# Patient Record
Sex: FEMALE | Race: WHITE
[De-identification: names, ages, dates, MRNs, and addresses within clinical notes are randomized per-mention and may not be internally consistent; named-entity substitution may affect disease eponyms.]

---

## 2021-01-03 ENCOUNTER — HOSPITAL ENCOUNTER (EMERGENCY)
Dept: HOSPITAL 75 - ER FS | Age: 66
Discharge: HOME | End: 2021-01-03
Payer: COMMERCIAL

## 2021-01-03 VITALS — SYSTOLIC BLOOD PRESSURE: 136 MMHG | DIASTOLIC BLOOD PRESSURE: 63 MMHG

## 2021-01-03 VITALS — SYSTOLIC BLOOD PRESSURE: 164 MMHG | DIASTOLIC BLOOD PRESSURE: 79 MMHG

## 2021-01-03 DIAGNOSIS — R42: Primary | ICD-10-CM

## 2021-01-03 DIAGNOSIS — Z88.8: ICD-10-CM

## 2021-01-03 DIAGNOSIS — R11.2: ICD-10-CM

## 2021-01-03 DIAGNOSIS — R19.7: ICD-10-CM

## 2021-01-03 DIAGNOSIS — R03.0: ICD-10-CM

## 2021-01-03 LAB
ALBUMIN SERPL-MCNC: 4.3 GM/DL (ref 3.2–4.5)
ALP SERPL-CCNC: 97 U/L (ref 40–136)
ALT SERPL-CCNC: 30 U/L (ref 0–55)
APTT BLD: 31 SEC (ref 24–35)
APTT PPP: YELLOW S
BACTERIA #/AREA URNS HPF: NEGATIVE /HPF
BASOPHILS # BLD AUTO: 0 10^3/UL (ref 0–0.1)
BASOPHILS NFR BLD AUTO: 0 % (ref 0–10)
BILIRUB SERPL-MCNC: 0.6 MG/DL (ref 0.1–1)
BILIRUB UR QL STRIP: NEGATIVE
BUN/CREAT SERPL: 15
CALCIUM SERPL-MCNC: 9.7 MG/DL (ref 8.5–10.1)
CHLORIDE SERPL-SCNC: 101 MMOL/L (ref 98–107)
CO2 SERPL-SCNC: 24 MMOL/L (ref 21–32)
CREAT SERPL-MCNC: 0.78 MG/DL (ref 0.6–1.3)
EOSINOPHIL # BLD AUTO: 0 10^3/UL (ref 0–0.3)
EOSINOPHIL NFR BLD AUTO: 0 % (ref 0–10)
FIBRINOGEN PPP-MCNC: CLEAR MG/DL
GFR SERPLBLD BASED ON 1.73 SQ M-ARVRAT: > 60 ML/MIN
GLUCOSE SERPL-MCNC: 138 MG/DL (ref 70–105)
GLUCOSE UR STRIP-MCNC: NEGATIVE MG/DL
HCT VFR BLD CALC: 39 % (ref 35–52)
HGB BLD-MCNC: 13 G/DL (ref 11.5–16)
INR PPP: 1 (ref 0.8–1.4)
KETONES UR QL STRIP: NEGATIVE
LEUKOCYTE ESTERASE UR QL STRIP: NEGATIVE
LIPASE SERPL-CCNC: 31 U/L (ref 8–78)
LYMPHOCYTES # BLD AUTO: 1.5 X 10^3 (ref 1–4)
LYMPHOCYTES NFR BLD AUTO: 18 % (ref 12–44)
MAGNESIUM SERPL-MCNC: 2 MG/DL (ref 1.6–2.4)
MANUAL DIFFERENTIAL PERFORMED BLD QL: NO
MCH RBC QN AUTO: 30 PG (ref 25–34)
MCHC RBC AUTO-ENTMCNC: 33 G/DL (ref 32–36)
MCV RBC AUTO: 90 FL (ref 80–99)
MONOCYTES # BLD AUTO: 0.3 X 10^3 (ref 0–1)
MONOCYTES NFR BLD AUTO: 3 % (ref 0–12)
NEUTROPHILS # BLD AUTO: 6.5 X 10^3 (ref 1.8–7.8)
NEUTROPHILS NFR BLD AUTO: 78 % (ref 42–75)
NITRITE UR QL STRIP: NEGATIVE
PH UR STRIP: 7 [PH] (ref 5–9)
PLATELET # BLD: 254 10^3/UL (ref 130–400)
PMV BLD AUTO: 10.4 FL (ref 7.4–10.4)
POTASSIUM SERPL-SCNC: 4 MMOL/L (ref 3.6–5)
PROT SERPL-MCNC: 7.1 GM/DL (ref 6.4–8.2)
PROT UR QL STRIP: NEGATIVE
PROTHROMBIN TIME: 13.1 SEC (ref 12.2–14.7)
RBC #/AREA URNS HPF: (no result) /HPF
SODIUM SERPL-SCNC: 136 MMOL/L (ref 135–145)
SP GR UR STRIP: 1.01 (ref 1.02–1.02)
SQUAMOUS #/AREA URNS HPF: (no result) /HPF
WBC # BLD AUTO: 8.3 10^3/UL (ref 4.3–11)
WBC #/AREA URNS HPF: (no result) /HPF

## 2021-01-03 PROCEDURE — 36415 COLL VENOUS BLD VENIPUNCTURE: CPT

## 2021-01-03 PROCEDURE — 81000 URINALYSIS NONAUTO W/SCOPE: CPT

## 2021-01-03 PROCEDURE — 85610 PROTHROMBIN TIME: CPT

## 2021-01-03 PROCEDURE — 85730 THROMBOPLASTIN TIME PARTIAL: CPT

## 2021-01-03 PROCEDURE — 80053 COMPREHEN METABOLIC PANEL: CPT

## 2021-01-03 PROCEDURE — 85025 COMPLETE CBC W/AUTO DIFF WBC: CPT

## 2021-01-03 PROCEDURE — 83735 ASSAY OF MAGNESIUM: CPT

## 2021-01-03 PROCEDURE — 84484 ASSAY OF TROPONIN QUANT: CPT

## 2021-01-03 PROCEDURE — 83690 ASSAY OF LIPASE: CPT

## 2021-01-03 PROCEDURE — 93041 RHYTHM ECG TRACING: CPT

## 2021-01-03 PROCEDURE — 93005 ELECTROCARDIOGRAM TRACING: CPT

## 2021-01-03 PROCEDURE — 71045 X-RAY EXAM CHEST 1 VIEW: CPT

## 2021-01-03 PROCEDURE — 83880 ASSAY OF NATRIURETIC PEPTIDE: CPT

## 2021-01-03 NOTE — ED GENERAL
General


Chief Complaint:  General Problems/Pain


Stated Complaint:  CHEST PAIN


Source of Information:  Patient, EMS





History of Present Illness


Date Seen by Provider:  Osman 3, 2021


Time Seen by Provider:  05:29


Initial Comments


66 yo female presenting to the ED by EMS after having n/v/d and dizziness with 

subjective fever and chills since last night around 2200. She reports her 

symptoms started with dizziness then the n/v/d and subjective fever and chills 

followed. She had to sleep upright in a chair because if she tried to lay back 

in bed she had more severe nausea and vomiting. She woke up this am around 230 

with right sided jaw pain and pain in center of chest. She checked her blood 

pressure at home and it was 180/100 so she took 4 baby aspirin with a cracker. 

She was worried that she would have a stroke with her blood pressure up and 

wanted the aspirin to thin her blood. She has been checking her pressure over 

the last few months because it has been fluctuating and increasing, especially 

at night. She just had Medicare start on January 1st and was assigned to a Dr. Vences here in [facility]. She reports that she takes some vitamins but no 

prescription medicine. She had an allergic reaction to prednisone eye drops 

otherwise no allergies that she knows of to cause problems from medicines.  This

morning she called EMS around 5 am to be evaluated because she was worried she 

might be having a stroke due to pressure and feeling weak all over. She has not 

had any exposure to anyone that is sick or having similar symptoms with  n/v/d. 

She has had vertigo in the past and it has caused her to have n/v but usually 

improved after she would vomit and this persisted all night.





Allergies and Home Medications


Allergies


Coded Allergies:  


     prednisone (Verified  Allergy, Unknown, 1/3/21)


   reaction to eye drops





Home Medications


Meclizine HCl 25 Mg Tablet, 25 MG PO Q6H PRN for DIZZINESS


   Prescribed by: HAFSA JEFF on 1/3/21 0644





Patient Home Medication List


Home Medication List Reviewed:  Yes





Review of Systems


Review of Systems


Constitutional:  chills, dizziness (with laying back, changing positions ), 

fever (subjective), weakness (general)


EENTM:  ear pain (pressure in ears); No epistaxis, No nose congestion


Respiratory:  No cough, No short of breath


Cardiovascular:  see HPI, chest pain (pain in center of chest and right jaw 

earlier this am)


Gastrointestinal:  see HPI; No abdominal pain; diarrhea; No melena; nausea, 

vomiting


Genitourinary:  no symptoms reported


Musculoskeletal:  no symptoms reported


Skin:  no symptoms reported; No rash


Psychiatric/Neurological:  Denies Headache, Denies Numbness, Denies Paresthesia;

Weakness (general)





Past Medical-Social-Family Hx


Past Med/Social Hx:  Reviewed Nursing Past Med/Soc Hx


Patient Social History


Alcohol Use:  Denies Use


Recreational Drug Use:  No


Smoking Status:  Never a Smoker


Recent Foreign Travel:  No


Contact w/Someone Who Travel:  No





Past Medical History


Surgeries:  No


Respiratory:  No


Cardiac:  Yes


Hypertension (labile hypertension)


Neurological:  No


Reproductive Disorders:  No


Genitourinary:  No


Musculoskeletal:  No


Endocrine:  No


HEENT:  No


Cancer:  No


Did You Recieve Any Treatments:  No


Psychosocial:  No


Integumentary:  No





Physical Exam


Vital Signs





Vital Signs - First Documented








 1/3/21





 05:35


 


Temp 36.5


 


Pulse 87


 


Resp 18


 


B/P (MAP) 191/90 (123)


 


Pulse Ox 99


 


O2 Delivery Room Air





Capillary Refill :


Height, Weight, BMI


Height: '"


Weight: lbs. oz. kg;  BMI


Method:


General Appearance:  No Apparent Distress, WD/WN


HEENT:  PERRL/EOMI, Moist Mucous Membranes, TM Abnormal (L) (clear TM with small

amount of clear effusion present in middle ear. No erythema or dullness noted.),

TM Abnormal (R) (dullness without erythema to TM)


Neck:  Full Range of Motion, Normal Inspection, Non Tender, Supple


Respiratory:  Chest Non Tender, Lungs Clear, Normal Breath Sounds, No Accessory 

Muscle Use, No Respiratory Distress


Cardiovascular:  Regular Rate, Rhythm, No JVD, No Murmur, Normal Peripheral 

Pulses


Gastrointestinal:  Normal Bowel Sounds, No Pulsatile Mass, Non Tender, Soft


Rectal:  Deferred


Extremity:  Normal Capillary Refill, Normal Inspection, No Pedal Edema


Neurologic/Psychiatric:  Alert, Oriented x3, Normal Mood/Affect, CNs II-XII Norm

as Tested


Skin:  Normal Color, Warm/Dry





Progress/Results/Core Measures


Suspected Sepsis


SIRS


Temperature: 


Pulse:  


Respiratory Rate: 


 


Laboratory Tests


1/3/21 05:41: White Blood Count 8.3


Blood Pressure  / 


Mean: 


 


Laboratory Tests


1/3/21 05:41: 


Creatinine 0.78, INR Comment 1.0, Platelet Count 254, Total Bilirubin 0.6








Results/Orders


Lab Results





Laboratory Tests








Test


 1/3/21


05:41 1/3/21


05:50 Range/Units


 


 


White Blood Count


 8.3 


 


 4.3-11.0


10^3/uL


 


Red Blood Count


 4.33 L


 


 4.35-5.85


10^6/uL


 


Hemoglobin 13.0   11.5-16.0  G/DL


 


Hematocrit 39   35-52  %


 


Mean Corpuscular Volume 90   80-99  FL


 


Mean Corpuscular Hemoglobin 30   25-34  PG


 


Mean Corpuscular Hemoglobin


Concent 33 


 


 32-36  G/DL





 


Red Cell Distribution Width 12.5   10.0-14.5  %


 


Platelet Count


 254 


 


 130-400


10^3/uL


 


Mean Platelet Volume 10.4   7.4-10.4  FL


 


Immature Granulocyte % (Auto) 0    %


 


Neutrophils (%) (Auto) 78 H  42-75  %


 


Lymphocytes (%) (Auto) 18   12-44  %


 


Monocytes (%) (Auto) 3   0-12  %


 


Eosinophils (%) (Auto) 0   0-10  %


 


Basophils (%) (Auto) 0   0-10  %


 


Neutrophils # (Auto) 6.5   1.8-7.8  X 10^3


 


Lymphocytes # (Auto) 1.5   1.0-4.0  X 10^3


 


Monocytes # (Auto) 0.3   0.0-1.0  X 10^3


 


Eosinophils # (Auto)


 0.0 


 


 0.0-0.3


10^3/uL


 


Basophils # (Auto)


 0.0 


 


 0.0-0.1


10^3/uL


 


Immature Granulocyte # (Auto)


 0.0 


 


 0.0-0.1


10^3/uL


 


Prothrombin Time 13.1   12.2-14.7  SEC


 


INR Comment 1.0   0.8-1.4  


 


Activated Partial


Thromboplast Time 31 


 


 24-35  SEC





 


Sodium Level 136   135-145  MMOL/L


 


Potassium Level 4.0   3.6-5.0  MMOL/L


 


Chloride Level 101     MMOL/L


 


Carbon Dioxide Level 24   21-32  MMOL/L


 


Anion Gap 11   5-14  MMOL/L


 


Blood Urea Nitrogen 12   7-18  MG/DL


 


Creatinine


 0.78 


 


 0.60-1.30


MG/DL


 


Estimat Glomerular Filtration


Rate > 60 


 


  





 


BUN/Creatinine Ratio 15    


 


Glucose Level 138 H    MG/DL


 


Calcium Level 9.7   8.5-10.1  MG/DL


 


Corrected Calcium 9.5   8.5-10.1  MG/DL


 


Magnesium Level 2.0   1.6-2.4  MG/DL


 


Total Bilirubin 0.6   0.1-1.0  MG/DL


 


Aspartate Amino Transf


(AST/SGOT) 26 


 


 5-34  U/L





 


Alanine Aminotransferase


(ALT/SGPT) 30 


 


 0-55  U/L





 


Alkaline Phosphatase 97     U/L


 


Troponin I < 0.30   <0.30  NG/ML


 


Pro-B-Type Natriuretic Peptide 81.2 H  <75.0  PG/ML


 


Total Protein 7.1   6.4-8.2  GM/DL


 


Albumin 4.3   3.2-4.5  GM/DL


 


Lipase 31   8-78  U/L


 


Urine Color  YELLOW   


 


Urine Clarity  CLEAR   


 


Urine pH  7.0  5-9  


 


Urine Specific Gravity  1.010 L 1.016-1.022  


 


Urine Protein  NEGATIVE  NEGATIVE  


 


Urine Glucose (UA)  NEGATIVE  NEGATIVE  


 


Urine Ketones  NEGATIVE  NEGATIVE  


 


Urine Nitrite  NEGATIVE  NEGATIVE  


 


Urine Bilirubin  NEGATIVE  NEGATIVE  


 


Urine Urobilinogen  0.2  < = 1.0  MG/DL


 


Urine Leukocyte Esterase  NEGATIVE  NEGATIVE  


 


Urine RBC (Auto)  NEGATIVE  NEGATIVE  


 


Urine RBC  NONE   /HPF


 


Urine WBC  NONE   /HPF


 


Urine Squamous Epithelial


Cells 


 5-10 


  /HPF





 


Urine Crystals  NONE   /LPF


 


Urine Bacteria  NEGATIVE   /HPF


 


Urine Casts  NONE   /LPF


 


Urine Mucus  NEGATIVE   /LPF


 


Urine Culture Indicated  NO   








My Orders





Orders - HAFSA JEFF MD


Comprehensive Metabolic Panel (1/3/21 05:44)


Lipase (1/3/21 05:44)


Ua Culture If Indicated (1/3/21 05:44)


Ed Iv/Invasive Line Start (1/3/21 05:44)


Cbc With Automated Diff (1/3/21 05:44)


Troponin I Fs (1/3/21 05:44)


Probnp Fs (1/3/21 05:44)


Magnesium (1/3/21 05:44)


Orthostatic Vital Signs (Adult (1/3/21 05:44)


Ekg Tracing (1/3/21 05:44)


Monitor-Rhythm Ecg Trace Only (1/3/21 05:44)


Protime With Inr (1/3/21 05:48)


Partial Thromboplastin Time (1/3/21 05:48)


Ns Iv 1000 Ml (Sodium Chloride 0.9%) (1/3/21 05:48)


Ondansetron Injection (Zofran Injectio (1/3/21 05:48)


Meclizine Tablet (Antivert Tablet) (1/3/21 05:48)


Chest 1 View Ap/Pa Only (1/3/21 05:49)


Ondansetron Injection (Zofran Injectio (1/3/21 06:38)





Vital Signs/I&O











 1/3/21 1/3/21 1/3/21





 05:35 05:45 06:42


 


Temp 36.5  


 


Pulse 87 76 68





  88 





  84 


 


Resp 18  16


 


B/P (MAP) 191/90 (123) 164/79 (107) 136/63





  163/90 (114) 





  165/87 (113) 


 


Pulse Ox 99  99


 


O2 Delivery Room Air  Room Air





Capillary Refill :


Progress Note #1:  


Progress Note


check labs, ECG, CXR, cardiac enzymes as well as lipase and general electrolytes

with liver and renal function.


Progress Note #2:  


   Time:  06:19


Progress Note


CBC without elevation of WBC count. Chemistry with mild elevation of glucose to 

138. No elevation of Troponin and BNP 81. Coags normal. No infection, glucose, 

protein on UA and specific gravity 1.010. 


Pt did not have significant change in her orthostatic vitals but she did feel 

dizzy with changing positions. She refused the Zofran feeling that the nausea 

was only when she tried to lay down. She took IVF and Meclizine. 


Considering that her symptom of chest pain and right jaw pain woke her up around

230 am and she has no elevation now of her levels this would be over 4 hours 

since onset of symptoms. 


Discharge to home on Meclizine. 


Advised pt there is a chance for Covid causing her symptoms and she could get a 

Rapid test with the urgent care if she was having worsening symptoms or more 

concern or we could do one before she leaves. She states she was exposed to 

family members that have Covid but felt that she stayed away from them distance 

wise and it was 5-7 days before they were diagnosed and was about 10 days ago. 


Encouraged to establish care with Dr. Vences and have further testing there. If not

improving or worsening then return or seek medical care for further testing.





ECG


Initial ECG Impression Date:  Osman 3, 2021


Initial ECG Impression Time:  05:43


Initial ECG Rate:  72


Initial ECG Rhythm:  Normal Sinus


Initial ECG Comparisson:  No Previous ECG Available


Comment


Sinus rhythm with rate of 72 bpm. IN interval of 228 ms with prolonged IN 

interval. QT interval of 410 ms and QTc interval of 449 ms. No acute ST 

elevation. No prior tracing available for comparison.





Diagnostic Imaging





   Diagonstic Imaging:  Xray


   Plain Films/CT/US/NM/MRI:  chest


Comments


On my review of her 1 view CXR she has no acute infiltrate, no cardiomegaly, no 

effusion.


   Reviewed:  Reviewed by Me





Departure


Impression





   Primary Impression:  


   Vertigo


   Additional Impressions:  


   Nausea vomiting and diarrhea


   Labile hypertension


Disposition:  01 HOME, SELF-CARE


Condition:  Stable





Departure-Patient Inst.


Decision time for Depature:  06:39


Referrals:  


NO,LOCAL PHYSICIAN (PCP)


Primary Care Physician








IRENA VENCES MD


Patient Instructions:  Nausea and Vomiting, Adult ED, Dizziness, Adult ED, Diarr

hea, Adult ED, Vertigo (a Type of Dizziness) (DC), High Blood Pressure (DC), 

DASH Diet





Add. Discharge Instructions:  


Stay well hydrated and try to make sure you are getting plenty of fluids 





Try taking Meclizine (Antivert) for dizziness and nausea. This would be 25 mg 

every 6-8 hours as needed for your dizziness and nausea.





Follow up with your new provider, Dr. Vences, to establish care and see how they 

want to help manage your blood pressure and any risk stratification testing with

your family history of heart disease and strokes. 





If your symptoms worsen, fever over 101 F, or have abdominal pain and uncontro

lled nausea or vomiting then return or seek medical care for further testing





All discharge instructions reviewed with patient and/or family. Voiced 

understanding.


Scripts


Meclizine HCl (Meclizine HCl) 25 Mg Tablet


25 MG PO Q6H PRN for DIZZINESS for 7 Days, #30 TAB 0 Refills


   Prov: HAFSA JEFF MD         1/3/21











HAFSA JEFF MD                Osman 3, 2021 05:55

## 2021-01-03 NOTE — DIAGNOSTIC IMAGING REPORT
INDICATION: Chest pain



COMPARISON: None available



TECHNIQUE: Single radiograph of the chest dated 01/03/2021



FINDINGS: The cardiac silhouette is within normal limits in size.

No significant pulmonary vascular congestion. The lungs are

clear. Mild elevation of the right hemidiaphragm. No pleural

effusion. Biapical pleural parenchymal scarring. No pneumothorax.

No acute osseous abnormality.



IMPRESSION: No acute cardiopulmonary abnormality.



Dictated by: 



  Dictated on workstation # [id number]

## 2021-10-14 ENCOUNTER — HOSPITAL ENCOUNTER (EMERGENCY)
Dept: HOSPITAL 75 - ER | Age: 66
Discharge: HOME | End: 2021-10-14
Payer: MEDICARE

## 2021-10-14 VITALS — SYSTOLIC BLOOD PRESSURE: 129 MMHG | DIASTOLIC BLOOD PRESSURE: 79 MMHG

## 2021-10-14 VITALS — HEIGHT: 64.17 IN | BODY MASS INDEX: 30.86 KG/M2 | WEIGHT: 180.78 LBS

## 2021-10-14 DIAGNOSIS — S01.412A: Primary | ICD-10-CM

## 2021-10-14 DIAGNOSIS — Z23: ICD-10-CM
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
## 2021-10-14 DIAGNOSIS — I10: ICD-10-CM

## 2021-10-14 PROCEDURE — 12011 RPR F/E/E/N/L/M 2.5 CM/<: CPT

## 2021-10-14 PROCEDURE — 90715 TDAP VACCINE 7 YRS/> IM: CPT

## 2021-10-14 NOTE — ED GENERAL
General


Chief Complaint:  Laceration


Stated Complaint:  CUT ON CHEEK


Source of Information:  Patient


Exam Limitations:  No Limitations





History of Present Illness


Date Seen by Provider:  Oct 14, 2021


Time Seen by Provider:  17:57


Initial Comments


Patient is a 66-year-old female presents ED with a laceration to her left cheek.

 Around 1130 this morning she was weed eating grass when she felt a sharp object

hit below her left eye.  This resulted in laceration.  She cleaned out the area 

with normal saline and hydroperoxide.  She states bleeding has been controlled. 

She used a Band-Aid over the area.  She is concerned that there may be a foreign

body in her cheek.  She reports very minimal pain.  Last tetanus shot 7 to 10 

years ago.  She denies of any headache, visual changes, neck pain, fever, cough,

chest pain





Allergies and Home Medications


Allergies


Coded Allergies:  


     prednisone (Verified  Allergy, Unknown, 1/3/21)


   reaction to eye drops





Patient Home Medication List


Home Medication List Reviewed:  Yes


Meclizine HCl (Meclizine HCl) 25 Mg Tablet, 25 MG PO Q6H PRN for DIZZINESS


   Prescribed by: HAFSA JEFF on 1/3/21 0644





Review of Systems


Review of Systems


Constitutional:  No no symptoms reported


EENTM:  see HPI, no symptoms reported


Respiratory:  see HPI


Gastrointestinal:  no symptoms reported, see HPI


Skin:  other (Laceration to left)





All Other Systems Reviewed


Negative Unless Noted:  Yes





Past Medical-Social-Family Hx


Past Medical History


Surgeries:  No


Respiratory:  No


Cardiac:  Yes


Hypertension


Neurological:  No


Reproductive Disorders:  No


Genitourinary:  No


Gastrointestinal:  No


Musculoskeletal:  No


Endocrine:  No


HEENT:  No


Cancer:  No


Did You Recieve Any Treatments:  No


Psychosocial:  No


Integumentary:  No


Blood Disorders:  No





Physical Exam


Vital Signs





Vital Signs - First Documented








 10/14/21





 17:46


 


Temp 36.4


 


Pulse 84


 


Resp 18


 


B/P (MAP) 117/74 (88)


 


Pulse Ox 98


 


O2 Delivery Room Air





Capillary Refill :


Height, Weight, BMI


Height: '"


Weight: lbs. oz. kg;  BMI


Method:


General Appearance:  No Apparent Distress, WD/WN


Eyes:  Bilateral Eye Normal Inspection, Bilateral Eye PERRL, Bilateral Eye EOMI


HEENT:  PERRL/EOMI, TMs Normal, Normal ENT Inspection


Neck:  Full Range of Motion, Normal Inspection, Non Tender


Respiratory:  Chest Non Tender, Lungs Clear, Normal Breath Sounds


Cardiovascular:  Regular Rate, Rhythm, No Edema, No Gallop


Gastrointestinal:  Normal Bowel Sounds, No Organomegaly


Back:  Normal Inspection, No CVA Tenderness, No Vertebral Tenderness


Skin:  Other (2 cm laceration superficial to the left cheek.  Possible foreign 

body noted.)





Procedures/Interventions





   Wound Location:  Face


   Wound Length (cm):  2


   Wound's Depth, Shape:  superficial


   Wound Explored:  foreign body removed


   Irrigated w/ Saline (ccs):  100


   Betadine Prep?:  Yes


   Anesthesia:  1% Lidocaine


   Volume Anesthetic (ccs):  2


   Suture:  Prolene


   Suture Size:  6-0


   Number of Sutures:  3


   Layer Closure?:  1





Progress/Results/Core Measures


Suspected Sepsis


SIRS


Temperature: 


Pulse:  


Respiratory Rate: 


 


Blood Pressure  / 


Mean:





Results/Orders


My Orders





Orders - JUVENAL BLACKWELL


Lidocaine 1% Inj 20 Ml (Xylocaine 1% Inj (10/14/21 18:00)


Dipht,Pertuss(Acell),Tet Adult (Boostrix (10/14/21 18:00)





Vital Signs/I&O











 10/14/21





 17:46


 


Temp 36.4


 


Pulse 84


 


Resp 18


 


B/P (MAP) 117/74 (88)


 


Pulse Ox 98


 


O2 Delivery Room Air





Capillary Refill :





Departure


Communication (Admissions)


Patient presents ED with a laceration to left cheek.  Foreign body was removed 

which was a piece of glass.  Was given a Tdap booster here in the ED.  3 sutures

were placed.  Remove in 6 days.  Discussed Neosporin.  Wound appears to be 

clean.  This was a superficial laceration that needed sutures.  If any worsening

symptoms such as redness, swelling strongly recommend return back to ED or 

follow-up with her PCP.  Return precautions were discussed with patient.  Very 

minimal pain.





Impression





   Primary Impression:  


   Laceration


Disposition:  01 HOME, SELF-CARE


Condition:  Improved





Departure-Patient Inst.


Decision time for Depature:  18:25


Referrals:  


NO,LOCAL PHYSICIAN (PCP/Family)


Primary Care Physician


Patient Instructions:  Laceration Repair With Stitches ED





Add. Discharge Instructions:  


Remove sutures in 6 to 7 days .  Recommend Neosporin.  If any worsening symptoms

such as redness, swelling to return back to ED.





All discharge instructions reviewed with patient and/or family. Voiced und

erstanding.











JUVENAL BLACKWELL          Oct 14, 2021 18:00

## 2021-10-20 ENCOUNTER — HOSPITAL ENCOUNTER (EMERGENCY)
Dept: HOSPITAL 75 - ER FS | Age: 66
Discharge: HOME | End: 2021-10-20
Payer: MEDICARE

## 2021-10-20 VITALS — DIASTOLIC BLOOD PRESSURE: 80 MMHG | SYSTOLIC BLOOD PRESSURE: 154 MMHG

## 2021-10-20 VITALS — WEIGHT: 187.39 LBS | HEIGHT: 63.78 IN | BODY MASS INDEX: 32.39 KG/M2

## 2021-10-20 DIAGNOSIS — Z48.02: Primary | ICD-10-CM
